# Patient Record
Sex: MALE | Race: WHITE
[De-identification: names, ages, dates, MRNs, and addresses within clinical notes are randomized per-mention and may not be internally consistent; named-entity substitution may affect disease eponyms.]

---

## 2019-04-05 ENCOUNTER — HOSPITAL ENCOUNTER (EMERGENCY)
Dept: HOSPITAL 94 - ER | Age: 44
Discharge: HOME | End: 2019-04-05
Payer: MEDICARE

## 2019-04-05 VITALS — DIASTOLIC BLOOD PRESSURE: 90 MMHG | SYSTOLIC BLOOD PRESSURE: 132 MMHG

## 2019-04-05 VITALS — HEIGHT: 72 IN | BODY MASS INDEX: 25.79 KG/M2 | WEIGHT: 190.39 LBS

## 2019-04-05 DIAGNOSIS — Z76.0: ICD-10-CM

## 2019-04-05 DIAGNOSIS — Z56.0: ICD-10-CM

## 2019-04-05 DIAGNOSIS — F41.9: Primary | ICD-10-CM

## 2019-04-05 PROCEDURE — 99283 EMERGENCY DEPT VISIT LOW MDM: CPT

## 2019-04-05 SDOH — ECONOMIC STABILITY - INCOME SECURITY: UNEMPLOYMENT, UNSPECIFIED: Z56.0

## 2019-05-23 ENCOUNTER — HOSPITAL ENCOUNTER (INPATIENT)
Dept: HOSPITAL 94 - ADULT MH | Age: 44
LOS: 13 days | Discharge: HOME | DRG: 885 | End: 2019-06-05
Attending: PSYCHIATRY & NEUROLOGY | Admitting: PSYCHIATRY & NEUROLOGY
Payer: MEDICARE

## 2019-05-23 VITALS — DIASTOLIC BLOOD PRESSURE: 99 MMHG | SYSTOLIC BLOOD PRESSURE: 134 MMHG

## 2019-05-23 VITALS — DIASTOLIC BLOOD PRESSURE: 91 MMHG | SYSTOLIC BLOOD PRESSURE: 130 MMHG

## 2019-05-23 VITALS — WEIGHT: 184.75 LBS | BODY MASS INDEX: 28 KG/M2 | HEIGHT: 68 IN

## 2019-05-23 DIAGNOSIS — N48.30: ICD-10-CM

## 2019-05-23 DIAGNOSIS — F25.1: Primary | ICD-10-CM

## 2019-05-23 DIAGNOSIS — F32.9: ICD-10-CM

## 2019-05-23 DIAGNOSIS — I10: ICD-10-CM

## 2019-05-23 DIAGNOSIS — Z71.6: ICD-10-CM

## 2019-05-23 DIAGNOSIS — R45.851: ICD-10-CM

## 2019-05-23 DIAGNOSIS — F17.210: ICD-10-CM

## 2019-05-23 DIAGNOSIS — Z82.0: ICD-10-CM

## 2019-05-23 DIAGNOSIS — E78.5: ICD-10-CM

## 2019-05-23 PROCEDURE — 84443 ASSAY THYROID STIM HORMONE: CPT

## 2019-05-23 PROCEDURE — 81003 URINALYSIS AUTO W/O SCOPE: CPT

## 2019-05-23 PROCEDURE — 80320 DRUG SCREEN QUANTALCOHOLS: CPT

## 2019-05-23 PROCEDURE — 83036 HEMOGLOBIN GLYCOSYLATED A1C: CPT

## 2019-05-23 PROCEDURE — 85025 COMPLETE CBC W/AUTO DIFF WBC: CPT

## 2019-05-23 PROCEDURE — 80305 DRUG TEST PRSMV DIR OPT OBS: CPT

## 2019-05-23 PROCEDURE — 80053 COMPREHEN METABOLIC PANEL: CPT

## 2019-05-23 PROCEDURE — 80061 LIPID PANEL: CPT

## 2019-05-23 PROCEDURE — 36415 COLL VENOUS BLD VENIPUNCTURE: CPT

## 2019-05-23 PROCEDURE — 99285 EMERGENCY DEPT VISIT HI MDM: CPT

## 2019-05-23 PROCEDURE — 87070 CULTURE OTHR SPECIMN AEROBIC: CPT

## 2019-05-23 NOTE — NUR
Admit note:

    Pt admitted to [facility] on a 5150 for depression at 1420 as danger 
to self. Pt reports he is suffering from auditory hallucinations that tell him to kill 
himself. Pt reports feeling suicidal as a direct result of the voices. Pt brought to room 
330A. Pt oriented to the unit. Pt cooperative with the admission process. Pt oriented to the 
unit. Medication rec completed.
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
## 2019-05-24 VITALS — SYSTOLIC BLOOD PRESSURE: 130 MMHG | DIASTOLIC BLOOD PRESSURE: 89 MMHG

## 2019-05-24 VITALS — DIASTOLIC BLOOD PRESSURE: 74 MMHG | SYSTOLIC BLOOD PRESSURE: 121 MMHG

## 2019-05-24 LAB
CHOLEST SERPL-MCNC: 238 MG/DL (ref 0–200)
CHOLEST/HDLC SERPL: 6.8 {RATIO} (ref 0–4.99)
HBA1C MFR BLD: 5.9 % (ref 4.5–6.2)
HDLC SERPL-MCNC: 35 MG/DL (ref 35–60)
LDLC SERPL DIRECT ASSAY-MCNC: 170 MG/DL (ref 50–100)
TRIGL SERPL-MCNC: 186 MG/DL (ref 20–135)

## 2019-05-24 RX ADMIN — NICOTINE SCH PATCH: 21 PATCH, EXTENDED RELEASE TRANSDERMAL at 12:42
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
## 2019-05-25 VITALS — DIASTOLIC BLOOD PRESSURE: 89 MMHG | SYSTOLIC BLOOD PRESSURE: 133 MMHG

## 2019-05-25 VITALS — DIASTOLIC BLOOD PRESSURE: 87 MMHG | SYSTOLIC BLOOD PRESSURE: 124 MMHG

## 2019-05-25 RX ADMIN — OLANZAPINE SCH MG: 5 TABLET, FILM COATED ORAL at 08:11

## 2019-05-25 RX ADMIN — NICOTINE SCH PATCH: 21 PATCH, EXTENDED RELEASE TRANSDERMAL at 08:12

## 2019-05-25 NOTE — NUR
Nursing Progress Note:



Legal hold: 5150



Client on involuntary status for DTS



Report received from MIGNON Magana with use of SBAR.



Why are they here: Pt admitted to [facility] on a 5150 for depression for DTS. Pt reports he is 
suffering from auditory hallucinations that tell him to kill himself. Pt reports feeling 
suicidal as a direct result of the voices. 



Assessment



What has happened this shift: Pt asleep at start of shift. Pt isolates some of the shift 
however, with prompting and encouragement he did go to the am group and outdoors with the 
group in the late afternoon. He agrees the medications are helping with his voices but does 
not elaborate on how the medication helps. Both Ativan and hydroxyzine given as PRNs to 
decrease his anxiety. 

S/I, H/I: Denies

A/VH: Denies

Sleep: SEE sleep assessment 

ADL's: Independent

Group attendance: AM and outside group 

Were meds taken: Yes

Any med S/E: None noted or reported



Mental Status Exam



Appearance: Clean, well groomed

Eye contact: Poor

Behavior: Isolative

Speech: low volume, rate, rhythm

Mood: Depressed

Affect: restricted, congruent to mood

Thought process: Linear 

Thought Content: unknown; appears to be RIS

Cognition: A /Ox4 

Insight: Fair

Judgment: Poor 



Interventions



PRN's used: 



Therapeutic interventions: Therapeutic communication and active listening, medication 
administration/monitoring/education, encouragement to attend groups, symptom identification 
and management, Q 15 min checks.



Restraints/seclusion/emergency medication: None



Justification of Continued Inpatient Treatment: Therapeutic support and medication 
management needed to provide stabilization of current A/H telling him to hurt and kill 
himself also prevent decompensation decreasing risk to patient for readmittance to 
in-patient unit.

## 2019-05-25 NOTE — NUR
Nursing Progress Note:



Legal hold: 5150



Client on involuntary status for DTS



Report received from MIGNON Barcenas with use of SBAR.



Why are they here: Pt admitted to [facility] on a 5150 for depression for DTS. Pt reports he is 
suffering from auditory hallucinations that tell him to kill himself. Pt reports feeling 
suicidal as a direct result of the voices. 



Assessment



What has happened this shift: Patient is in his room at change of shift laying in bed in the 
dark. He agrees to a 1:1 assessment at his bedside. His left forearm dressing is CDI at this 
time and no drainage or odor is noted. He denies SI/HI, and VH. He states he is still 
hearing voices but they are not telling him ot harm himself and that they have decreased a 
"tiny bit" since being on the unit. He expresses that he does feel more depressed today and 
rate it a 8/10, but states that going outside today helped a bit. He makes good eye contact 
during conversation and talks a bit more than yesterday. He joins other clients in the group 
room for evening snack before going back to bed. He is compliant for all evening 
medications.

 

S/I, H/I: Denies

A/VH: Confirms AH, denies VH

Sleep: SEE sleep assessment 

ADL's: Independent

Group attendance: No groups this shift

Were meds taken: Yes

Any med S/E: None noted or reported



Mental Status Exam



Appearance: Clean, well groomed

Eye contact: Fair

Behavior: Isolative

Speech: low volume, rate, rhythm

Mood: Depressed

Affect: Restricted, congruent to mood

Thought process: Linear 

Thought Content: Unknown; appears to be RIS

Cognition: A /Ox4 

Insight: Fair

Judgment: Poor 



Interventions



PRN's used: Tylenol



Therapeutic interventions: Therapeutic communication and active listening, medication 
administration/monitoring/education, encouragement to attend groups, symptom identification 
and management, Q 15 min checks.



Restraints/seclusion/emergency medication: None



Justification of Continued Inpatient Treatment: Therapeutic support and medication 
management needed to provide stabilization of current A/H telling him to hurt and kill 
himself also prevent decompensation decreasing risk to patient for readmittance to 
in-patient unit.
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
## 2019-05-26 VITALS — SYSTOLIC BLOOD PRESSURE: 126 MMHG | DIASTOLIC BLOOD PRESSURE: 98 MMHG

## 2019-05-26 VITALS — DIASTOLIC BLOOD PRESSURE: 84 MMHG | SYSTOLIC BLOOD PRESSURE: 112 MMHG

## 2019-05-26 RX ADMIN — NICOTINE SCH PATCH: 21 PATCH, EXTENDED RELEASE TRANSDERMAL at 08:12

## 2019-05-26 RX ADMIN — OLANZAPINE SCH MG: 5 TABLET, FILM COATED ORAL at 07:47

## 2019-05-26 RX ADMIN — NICOTINE SCH PATCH: 21 PATCH, EXTENDED RELEASE TRANSDERMAL at 07:49

## 2019-05-26 RX ADMIN — OLANZAPINE SCH MG: 5 TABLET, ORALLY DISINTEGRATING ORAL at 21:02

## 2019-05-26 NOTE — NUR
Nursing Progress Note:



Legal hold: 5150



Client on involuntary status for DTS



Report received from MIGNON Magana with use of SBAR.



Why are they here: Pt admitted to [facility] on a 5150 for depression at 1420 for DTS. Pt reports 
he is suffering from auditory hallucinations that tell him to kill himself.  Pt reports 
feeling suicidal as a direct result of the voices. 



Assessment



What has happened this shift: Isolated in his room for the majority of the shift. Out for 
meals. Eats poorly and quickly. Returns immediately to his room. When asked to explain the 
importance of being in his room, patient stated "Being around people makes me feel anxious." 
When asked the meaning behind staying in his bed, patient stated "I just lay in bed and 
listen to my voices." Understates the suffering he is experiences due to AH. Believes 
"they'll always be be there. Here I feel safe." in reference to self-harm behavior.  Has no 
hope at present that medication will be able to diminish or eliminate the voices. States he 
was once happy "when I was . My wife ended the marriage. She didn't want to be with 
someone on disability." Patient does not speak unless spoken to. Also he provides the 
minimum amount of information to answer questions posed by staff. 

 

S/I, H/I: Denies; states when he is in a hospital they don't tell me to hurt myself. 

A/VH: Denies

Sleep: SEE sleep assessment provided in EHR 

ADL's: Independent

Group attendance: Not today

Were meds taken: Yes

Any med S/E: None noted or reported



Mental Status Exam



Appearance: Clean, well groomed

Eye contact: Poor

Behavior: Isolative

Speech: low volume, rate, rhythm

Mood: Depressed

Affect: restricted, congruent to mood

Thought process: Linear 

Thought Content: unknown; appears to be RIS

Cognition: A & Ox4 

Insight: Fair

Judgment: Poor 



Interventions



PRN's used: 



Therapeutic interventions: 1:1 assessment , unit procedure education, medication 
administration/monitoring/education, encouragement to attend groups, therapeutic 
conversation & establishment of rapport, symptom identification and management, Q 15 min 
checks.



Restraints/seclusion/emergency medication: None



Justification of Continued Inpatient Treatment: Therapeutic support and medication 
management needed to provide stabilization of current A/H telling him to hurt and kill 
himself also prevent decompensation decreasing risk to patient for readmittance to 
in-patient unit.

## 2019-05-26 NOTE — NUR
Nursing Progress Note:



Legal hold: VOL for DTS



Report received from MIGNON Barcenas with use of SBAR.



Why are they here: Pt admitted to [facility] on a 5150 for depression for DTS. Pt reports he is 
suffering from auditory hallucinations that tell him to kill himself. Pt reports feeling 
suicidal as a direct result of the voices. 



Assessment



What has happened this shift: 



Pt in room looking out the window at change of shift. He attended HS snack then returned to 
his room to sleep. During 1:1, he stated "I feel 9/10 on the good scale, I am feeling 
better." Voices are still present, but "less and more in the background" and saying "get a 
job or go help others". Pt states even though the voices are making positive statements, 
they are irksome. Pt denies SI and says he feels a "a little anxious". He states he doesn't 
like going to groups because it makes him nervous that they will make him talk. RN 
encouraged to go, and be honest about feeling that way with the . Left forearm 
dressing CDI. He requested an additional snack before turning in to sleep for the evening.

 

S/I, H/I: Denies

A/VH: Denies VH, +AH: positive statements (see above)

Sleep: See Sleep Hour Charting

ADL's: Independent

Group attendance:Y - HS Snack

Were meds taken: Y

Any med S/E: None noted or reported



Mental Status Exam



Appearance: Clean, well groomed in personal clothing and nonskid socks

Eye contact: Intermittent

Behavior: Withdrawn to room except to attend snack

Speech: WNL

Mood: "I feel better"

Affect: Blunted

Thought process: Linear 

Thought Content: the voices bothering him

Cognition: A&Ox4 

Insight: Fair

Judgment: Poor to Fair



Interventions



PRN's used: Tylenol, Atarax



Therapeutic interventions: Therapeutic communication and active listening, medication 
administration/monitoring/education, encouragement to attend groups, symptom identification 
and management, Q 15 min checks.



Restraints/seclusion/emergency medication: None



Justification of Continued Inpatient Treatment: Therapeutic support and medication 
management needed to provide stabilization of current A/H to  prevent decompensation 
decreasing risk to patient for readmittance to in-patient unit.

## 2019-05-27 VITALS — DIASTOLIC BLOOD PRESSURE: 79 MMHG | SYSTOLIC BLOOD PRESSURE: 106 MMHG

## 2019-05-27 VITALS — DIASTOLIC BLOOD PRESSURE: 99 MMHG | SYSTOLIC BLOOD PRESSURE: 140 MMHG

## 2019-05-27 RX ADMIN — NICOTINE POLACRILEX PRN LOZ: 2 LOZENGE ORAL at 19:04

## 2019-05-27 RX ADMIN — OLANZAPINE SCH MG: 5 TABLET, FILM COATED ORAL at 08:04

## 2019-05-27 RX ADMIN — NICOTINE POLACRILEX PRN LOZ: 2 LOZENGE ORAL at 15:01

## 2019-05-27 RX ADMIN — NICOTINE POLACRILEX PRN LOZ: 2 LOZENGE ORAL at 10:03

## 2019-05-27 RX ADMIN — OLANZAPINE SCH MG: 5 TABLET, ORALLY DISINTEGRATING ORAL at 20:54

## 2019-05-27 NOTE — NUR
Nursing Progress Note:



Legal hold: VOL for DTS



Report received from MIGNON Barcenas with use of SBAR.



Why are they here: Pt admitted to [facility] on a 5150 for depression for DTS. Pt reports he is 
suffering from auditory hallucinations that tell him to kill himself. Pt reports feeling 
suicidal as a direct result of the voices. 



Assessment



What has happened this shift: 



Pt in room looking out the window at change of shift. He attended HS snack then watched some 
TV with peers before returing to his room. During 1:1, he stated "I feel much better since 
being admitted. I feel good today." He continued, "The voices are saying neutral things, I 
can't remember what they say exactly, its more like in the background really faint." RN 
reinforced pt going to groups, pt states "I went even though I don't like to share. I'll 
keep going and maybe I'll share more next time."   Left forearm dressing CDI. 

 

S/I, H/I: Denies

A/VH: Denies VH, +AH: "neutral statements" "background noise"

Sleep: See Sleep Hour Charting

ADL's: Independent

Group attendance:Y - HS Snack

Were meds taken: Y

Any med S/E: None noted or reported



Mental Status Exam



Appearance: Clean, well groomed in personal clothing and nonskid socks

Eye contact: Intermittent

Behavior: Withdrawn to room except to attend snack

Speech: WNL

Mood: "Good"

Affect: Blunted with some brightening

Thought process: Linear 

Thought Content: Focusing on overall wellness

Cognition: A&Ox4 

Insight: Fair to good

Judgment: Fair to good



Interventions



PRN's used: Tylenol



Therapeutic interventions: Therapeutic communication and active listening, medication 
administration/monitoring/education, encouragement to attend groups, symptom identification 
and management, Q 15 min checks.



Restraints/seclusion/emergency medication: None



Justification of Continued Inpatient Treatment: Therapeutic support and medication 
management needed to provide stabilization of current A/H to  prevent decompensation 
decreasing risk to patient for readmittance to in-patient unit.

## 2019-05-27 NOTE — NUR
Nursing Progress Note:



Legal hold: 5250 DTS



Report received from Mable Sandoval RN with use of SBAR.



Why are they here: Pt admitted to [facility] on a 5150 for depression for DTS. Pt reports he is 
suffering from auditory hallucinations that tell him to kill himself. Pt reports feeling 
suicidal as a direct result of the voices. 



Assessment



What has happened this shift: 



Patient observed sleeping at change of shift. He is awoken just prior to breakfast and joins 
others in the group room for breakfast. He quickly eats and then returns to his room. He 
takes his morning medications but refuses to take Lipitor. He states that it makes it hard 
for him to stand and makes his knees hurt. He says that he hears voices all the time but 
that when he tries to figure out what they say he cannot. He states they increase when he is 
alone but that he does not like being around others. He reports attempting group but not 
liking to have to share. He is encouraged to attend group and pass on sharing. He states 
that he will try.



Patient does attend both groups today. He reports that he mostly listened but did share as 
well. He states that he enjoys the window in his room and finds the view  more therapeutic 
than anything else. He is friendly throughout the day. He c/o nicotine cravings twice and is 
provided lozenges. He states that he is doing well and has no issues.



*Wound cleaned and dressing changed.*





S/I, H/I: none reported

A/VH: reports constant AH, unable to determine what they are saying at this time

Sleep: 7.25hrs NOC

ADL's: Independent

Group attendance:yes

Were meds taken: refused Lipitor

Any med S/E: none reported, no IMs or tremors observed



Mental Status Exam



Appearance: clean, dressed in own clothing

Eye contact: avoided, looks down often during conversation

Behavior: calm, cooperative, moments of anxiety

Speech: soft tone, normal rate and rhythm 

Mood: reports good mood

Affect: restricted with brightening

Thought process: Linear 

Thought Content: no delusional thought content expressed, focused on feeling better

Cognition: A&Ox4 

Insight: Fair

Judgment: Poor to Fair



Interventions



PRN's used: nicotine lozenges xs2 and Atarax for anxiety



Therapeutic interventions: Provided therapeutic assessment, maintained safe therapeutic 
milieu, provided active listening with positive feedback, medication education as needed, 
monitored for change in behavior and needed intervention. Q 15 min checks.



Restraints/seclusion/emergency medication: None



Justification of Continued Inpatient Treatment: Therapeutic support and medication 
management needed to provide stabilization of current A/H to  prevent decompensation 
decreasing risk to patient for readmittance to in-patient unit.

-------------------------------------------------------------------------------

Addendum: 05/27/19 at 1652 by Trina Pemberton RN

-------------------------------------------------------------------------------

Legal hold: voluntary

## 2019-05-28 VITALS — SYSTOLIC BLOOD PRESSURE: 144 MMHG | DIASTOLIC BLOOD PRESSURE: 94 MMHG

## 2019-05-28 VITALS — SYSTOLIC BLOOD PRESSURE: 121 MMHG | DIASTOLIC BLOOD PRESSURE: 86 MMHG

## 2019-05-28 RX ADMIN — NICOTINE POLACRILEX PRN LOZ: 2 LOZENGE ORAL at 20:24

## 2019-05-28 RX ADMIN — NICOTINE POLACRILEX PRN LOZ: 2 LOZENGE ORAL at 08:03

## 2019-05-28 RX ADMIN — OLANZAPINE SCH MG: 5 TABLET, FILM COATED ORAL at 08:04

## 2019-05-28 RX ADMIN — NICOTINE POLACRILEX PRN LOZ: 2 LOZENGE ORAL at 12:17

## 2019-05-28 RX ADMIN — NICOTINE POLACRILEX PRN LOZ: 2 LOZENGE ORAL at 16:37

## 2019-05-28 RX ADMIN — OLANZAPINE SCH MG: 5 TABLET, ORALLY DISINTEGRATING ORAL at 20:06

## 2019-05-28 NOTE — NUR
Initial: patient eating 100% of meals today. Average PO intake % with 

some meals earlier in admission average 25-49%. Overall meeting his 

nutrition needs. 

Recommend: 

1. continue vegetarian diet

2. weekly weights

-------------------------------------------------------------------------------

Addendum: 05/28/19 at 1256 by Gabriela Lackey RD

-------------------------------------------------------------------------------

Amended: Links added.

## 2019-05-28 NOTE — NUR
Nursing Progress Note:



Legal hold: voluntary



Report received from Mable Sandoval RN with use of SBAR.



Why are they here: Pt admitted to [facility] on a 5150 for depression for DTS. Pt reports he is 
suffering from auditory hallucinations that tell him to kill himself. Pt reports feeling 
suicidal as a direct result of the voices. 



Assessment



What has happened this shift: 



Patient observed sleeping at change of shift. Just before breakfast he wakes. He states that 
he slept OK the night before, waking occasionally throughout the night. Patient joins others 
in the group room for breakfast and then returns to his room to take his medications. He 
denies distinguishable AH and does not want to harm himself. He states that the medication 
has been helpful in treating his symptoms. He is observed throughout the day spending 
appropriate amount of time alone as well as with others. He paces the chase, reads books, 
rests and watches t.v..





S/I, H/I: none reported

A/VH: reports constant AH, unable to determine what they are saying at this time

Sleep: 7.25hrs NOC and rested during the day

ADL's: Independent

Group attendance:yes, afternoon group

Were meds taken: refused Lipitor

Any med S/E: none reported, no IMs or tremors observed



Mental Status Exam



Appearance: clean, dressed in own clothing

Eye contact: improved, will look away with prolong eye contact

Behavior: calm, cooperative, moments of anxiety

Speech: soft tone, normal rate and rhythm 

Mood: reports good mood

Affect: restricted with brightening

Thought process: Linear 

Thought Content: no delusional thought content expressed, focused on feeling better

Cognition: A&Ox4 

Insight: Fair

Judgment: Poor to Fair



Interventions



PRN's used: nicotine lozenges xs2 and Tylenol for pain

Therapeutic interventions: Provided therapeutic assessment, maintained safe therapeutic 
milieu, provided active listening with positive feedback, medication education as needed, 
monitored for change in behavior and needed intervention. Q 15 min checks.



Restraints/seclusion/emergency medication: None



Justification of Continued Inpatient Treatment: Patient recently suffering from negative and 
command audio hallucinations telling him to harm himself. Patient presented to unit suicidal 
with self inflicted burns on his arm. Therapeutic support and medication management needed 
to provide stabilization, prevent decompensation, decreasing risk to patient for 
readmittance to in-patient unit.

## 2019-05-28 NOTE — NUR
Nursing Progress Note:



Legal hold: voluntary



Report received from MIGNON Barcenas with use of SBAR.



Why are they here: Pt admitted to [facility] on a 5150 for depression for DTS. Pt reports he is 
suffering from auditory hallucinations that tell him to kill himself. Pt reports feeling 
suicidal as a direct result of the voices. 



Assessment



What has happened this shift: The patient has been up on the unit but withdrawn and quiet. 
During the evening assessment he gave minimal eye contact. He stated that his appetite has 
been good. Stated last night he slept poorly last night. He stated this evening his anxiety 
was relatively low. He stated that he is hearing voices but stated half the time he can't 
understand what they are saying but they have been telling him "get a job" "way to go" 
"cheer up" He denies feeling paranoid. He denies tactile or visual hallucinations. He stated 
that he felt his mood was "stable" He denies significant depression. His brother came to 
visit and he states he is supportive. 







S/I, H/I: none reported

A/VH: He denies visual, tactile hallucinations and paranoia. Continues to have auditory 
hallucinations

Sleep: 

ADL's: Independent

Group attendance: No PM group

Were meds taken: Yes

Any med S/E: none reported or observed



Mental Status Exam



Appearance: clean, dressed in own clothing

Eye contact: Poor eye contact

Behavior: calm, cooperative, moments of anxiety

Speech: soft tone, normal rate and rhythm 

Mood: reports good mood

Affect: restricted 

Thought process: Linear 

Thought Content: no delusional thought content expressed

Cognition: A&Ox4 

Insight: Fair

Judgment: Poor to Fair



Interventions



PRN's used: nicotine lozenges xs2 and Tylenol for pain

Therapeutic interventions: Provided therapeutic assessment, maintained safe therapeutic 
milieu, provided active listening with positive feedback, medication education as needed, 
monitored for change in behavior and needed intervention. Q 15 min checks.



Restraints/seclusion/emergency medication: None



Justification of Continued Inpatient Treatment: Patient recently suffering from negative and 
command audio hallucinations telling him to harm himself. Patient presented to unit suicidal 
with self inflicted burns on his arm. Therapeutic support and medication management needed 
to provide stabilization, prevent decompensation, decreasing risk to patient for 
readmittance to in-patient unit.

## 2019-05-29 VITALS — SYSTOLIC BLOOD PRESSURE: 134 MMHG | DIASTOLIC BLOOD PRESSURE: 92 MMHG

## 2019-05-29 VITALS — DIASTOLIC BLOOD PRESSURE: 82 MMHG | SYSTOLIC BLOOD PRESSURE: 115 MMHG

## 2019-05-29 RX ADMIN — NICOTINE POLACRILEX PRN LOZ: 2 LOZENGE ORAL at 09:05

## 2019-05-29 RX ADMIN — NICOTINE POLACRILEX PRN LOZ: 2 LOZENGE ORAL at 17:53

## 2019-05-29 RX ADMIN — OLANZAPINE SCH MG: 5 TABLET, FILM COATED ORAL at 08:09

## 2019-05-29 RX ADMIN — OLANZAPINE SCH MG: 5 TABLET, ORALLY DISINTEGRATING ORAL at 21:02

## 2019-05-29 RX ADMIN — NICOTINE POLACRILEX PRN LOZ: 2 LOZENGE ORAL at 13:36

## 2019-05-29 NOTE — NUR
Nursing Progress Note:



Legal hold: voluntary



Report received from MIGNON Lopez with use of SBAR.



Why are they here: Pt admitted to [facility] on a 5150 for depression for DTS. Pt reported 
suffering from auditory hallucinations that told him to kill himself. Pt reported feeling 
suicidal as a direct result of the voices. 



Assessment



What has happened this shift: 



Patient is observed walking around at change of shift, he reports that he slept well the 
night before and straight through the night. He says that he is having AH and that they tell 
him to take a shower. He eats his breakfast with others in the group room and then returns 
to his room to rest. He takes all his medications without any issue. 



He does not attend group and states that it is hard for him to sit still. He reports that in 
the middle of the day he feels anxious but when offered Atarax, or Ativan he declines.

He is calm and pleasant throughout the day.



S/I, H/I: none reported

A/VH: reports AH that tell him to take a shower

Sleep: 7hrs NOC and rested during the day

ADL's: Independent

Group attendance: states he tried

Were meds taken: yes

Any med S/E: none reported, no IMs or tremors observed



Mental Status Exam



Appearance: clean, dressed in own clothing but changed into green scrubs

Eye contact: direct

Behavior: friendly and cooperative, moments of anxiety

Speech: soft tone, normal rate and rhythm 

Mood: reports good mood

Affect: appropriate, congruent to mood

Thought process: Linear 

Thought Content: no delusional thought content expressed, focused on feeling better

Cognition: A&Ox4 

Insight: Fair

Judgment: Poor to Fair



Interventions



PRN's used: nicotine lozenges xs2



Therapeutic interventions: Provided therapeutic assessment, maintained safe therapeutic 
milieu, provided active listening with positive feedback, medication education as needed, 
monitored for change in behavior and needed intervention. Q 15 min checks.



Restraints/seclusion/emergency medication: None



Justification of Continued Inpatient Treatment: Patient recently suffering from negative and 
command audio hallucinations telling him to harm himself. Patient presented to unit suicidal 
with self inflicted burns on his arm. Therapeutic support and medication management needed 
to provide stabilization, prevent decompensation, decreasing risk to patient for 
readmittance to in-patient unit.

## 2019-05-29 NOTE — NUR
1:1 DISCHARGE PLANNING:



SW contacted [facility] to learn of pt's assigned medical home. SW informed 
that pt will be assigned to [facility]. SID will schedule aftercare tx w/ 
Dr. Magaña for post discharge tx.



CRISTIAN PérezW

## 2019-05-30 VITALS — SYSTOLIC BLOOD PRESSURE: 126 MMHG | DIASTOLIC BLOOD PRESSURE: 91 MMHG

## 2019-05-30 VITALS — DIASTOLIC BLOOD PRESSURE: 95 MMHG | SYSTOLIC BLOOD PRESSURE: 124 MMHG

## 2019-05-30 RX ADMIN — NICOTINE POLACRILEX PRN LOZ: 2 LOZENGE ORAL at 16:59

## 2019-05-30 RX ADMIN — NICOTINE POLACRILEX PRN LOZ: 2 LOZENGE ORAL at 13:33

## 2019-05-30 RX ADMIN — OLANZAPINE SCH MG: 5 TABLET, FILM COATED ORAL at 08:21

## 2019-05-30 RX ADMIN — OLANZAPINE SCH MG: 5 TABLET, ORALLY DISINTEGRATING ORAL at 20:44

## 2019-05-30 RX ADMIN — NICOTINE POLACRILEX PRN LOZ: 2 LOZENGE ORAL at 08:51

## 2019-05-30 RX ADMIN — NICOTINE POLACRILEX PRN LOZ: 2 LOZENGE ORAL at 19:07

## 2019-05-30 NOTE — NUR
Nursing Progress Note:



Legal hold: VOL for DTS



Report received from MIGNON Sahu with use of SBAR.



Why are they here: Pt admitted to [facility] on a 5150 for depression for DTS. Pt reports he is 
suffering from auditory hallucinations that tell him to kill himself. Pt reports feeling 
suicidal as a direct result of the voices. 



Assessment



What has happened this shift: 



Pt walking the halls at change of shift. During 1:1, pt stated he is feeling "much better" 
and that the voices have faded significantly since his admittance. "I think the Zyprexa is 
really helping." RN stated that pt appears anxious; Pt replied "I do get a little antsy 
that's why I walk around or watch TV. It helps quiet the voices. It's when I'm still the 
voices are clearer. The Remeron is helping me relax and sleep at night, and the voices 
aren't saying anything negative, I can just notice them more if I'm not doing anything." He 
attended HS snack then walked around before returning to his room. Pt's wound is open to 
air; pt not picking it and it is healing well. No drainage or odor present.

 

S/I, H/I: Denies

A/VH: Denies VH, +AH: "background noise, can't remember what they say but it's not negative"

Sleep: See Sleep Hour Charting

ADL's: Independent

Group attendance:Y - HS Snack

Were meds taken: Y

Any med S/E: None noted or reported



Mental Status Exam



Appearance: Clean, well groomed in personal clothing and nonskid socks

Eye contact: Intermittent

Behavior: Walking halls

Speech: Clear

Mood: "Much better"

Affect: Blunted with some brightening

Thought process: Linear 

Thought Content: Focusing on overall wellness

Cognition: A&Ox4 

Insight: Fair to good

Judgment: Fair to good



Interventions



PRN's used: Tylenol



Therapeutic interventions: Therapeutic communication and active listening, medication 
administration/monitoring/education, encouragement to attend groups, symptom identification 
and management, Q 15 min checks.



Restraints/seclusion/emergency medication: None



Justification of Continued Inpatient Treatment: Therapeutic support and medication 
management needed to provide stabilization of current A/H to  prevent decompensation 
decreasing risk to patient for readmittance to in-patient unit.

## 2019-05-30 NOTE — NUR
Nursing Progress Note:



Legal hold: VOL for DTS



Report received from ÁNGEL Lopez with use of SBAR.



Why are they here: Pt admitted to [facility] on a 5150 for depression for DTS. Pt reported he was 
suffering from auditory hallucinations that tell him to kill himself. Pt reported feeling 
suicidal as a direct result of the voices. 



Assessment

What has happened this shift:  Pt was awake and laying in bed at the change of shift. He was 
cooperative with assessment. The pt reported anxiety of 8/10, but refused medication for 
anxiety. Pt was using coping skills and walking to help relieve anxiety. He denied 
depression and SI. He said he was still hearing voices talking, but the voices are not 
telling him to do anything. He said, "Voices in my head, thinking them." He was out of him 
room frequently walking in the halls. PRN Nicotine Lozenge administered x2. He requested 
only one Nicotine lozenge instead of two.  Pt has healing wounds on  L arm, MARIAMA with not 
discharge or odor noted.   



S/I, H/I: Denies

A/VH: Denies VH but states he hears voices talking.

Sleep: Napped

ADL's: Independent

Group attendance: No

Were meds taken: Yes

Any med S/E: None noted or reported



Mental Status Exam

Appearance: Neat and clean

Eye contact: Intermittent

Behavior: Occasionally walks in the halls

Speech: Normal rate and rhythm

Mood: Anxious

Affect: Constricted

Thought process: Linear and connected 

Thought Content: Hearing voices

Cognition: A&Ox4 

Insight: Fair 

Judgment: Fair



Interventions



PRN's used: Tylenol, Nicotine lozenge



Therapeutic interventions: Therapeutic communication and active listening, medication 
administration/monitoring/education, encouragement to attend groups, symptom identification 
and management, maintained therapeutic milieu, Q 15 min checks.



Restraints/seclusion/emergency medication: None



Justification of Continued Inpatient Treatment: Therapeutic support and medication 
management needed to provide stabilization of current A/H to  prevent decompensation 
decreasing risk to patient for readmittance to in-patient unit.

-------------------------------------------------------------------------------

Addendum: 05/30/19 at 1714 by Ting Bhakta RN

-------------------------------------------------------------------------------

Amend: Pt's current wt by standing scale is 83.8.  Pt's wt in ER on 5/22 was 80 kg.  There 
is a discrepancy between today's wt and the 63.9 kg wt charted in pt's admission assessment.

## 2019-05-30 NOTE — NUR
1:1 DISCHARGE PLANNING



SW faxed completed new patient packet to [facility] for pt discharge 
appointment scheduling. SID also faxed pt notes, medications and History & Physical 
information. This writer will schedule a discharge appointment with [facility] on Monday, June 3rd, when she returns for her regularly scheduled 
day.



ITZ Pérez

## 2019-05-31 VITALS — SYSTOLIC BLOOD PRESSURE: 143 MMHG | DIASTOLIC BLOOD PRESSURE: 99 MMHG

## 2019-05-31 VITALS — SYSTOLIC BLOOD PRESSURE: 122 MMHG | DIASTOLIC BLOOD PRESSURE: 84 MMHG

## 2019-05-31 RX ADMIN — OLANZAPINE SCH MG: 5 TABLET, FILM COATED ORAL at 08:33

## 2019-05-31 RX ADMIN — NICOTINE POLACRILEX PRN LOZ: 2 LOZENGE ORAL at 19:40

## 2019-05-31 RX ADMIN — OLANZAPINE SCH MG: 5 TABLET, ORALLY DISINTEGRATING ORAL at 20:36

## 2019-05-31 RX ADMIN — NICOTINE POLACRILEX PRN LOZ: 2 LOZENGE ORAL at 15:35

## 2019-05-31 RX ADMIN — NICOTINE POLACRILEX PRN LOZ: 2 LOZENGE ORAL at 11:36

## 2019-05-31 RX ADMIN — NICOTINE POLACRILEX PRN LOZ: 2 LOZENGE ORAL at 08:44

## 2019-05-31 NOTE — NUR
Nursing Progress Note:



Legal hold: VOL for DTS



Report received from MIGNON Sahu with use of SBAR.



Why are they here: Pt admitted to [facility] on a 5150 for depression for DTS. Pt reports he is 
suffering from auditory hallucinations that tell him to kill himself. Pt reports feeling 
suicidal as a direct result of the voices. 



Assessment



What has happened this shift: Pt in walking back to his room from group room at shift 
change. This was first time with patient, established rapport. Pt allowed 1:1 assessment at 
bedside. Pt was cooperative and medication compliant. Pt reports anxiety 3/10 and depression 
3/10. Pt denies SI and states the voices have decreased. Pt states he is hoping he goes home 
soon and will be discharged home to brothers. When asked about wounds on arms, pt does 
acknowledge it was due to self-harm, but would not elaborate. 

 

S/I, H/I: None reported or observed

A/VH: Denies VH, +AH: "voices have decreased"

Sleep: See sleep assessment notation

ADL's: Independent

Group attendance: Night shift, no group

Were meds taken: Medication compliant

Any med S/E: None reported or observed





Mental Status Exam



Appearance: Clean, well groomed in personal clothing and nonskid socks

Eye contact: Intermittent

Behavior: Cooperative, quiet

Speech: Normal rate and rhythm

Mood: Depressed

Affect: Constricted 

Thought process: Linear 

Thought Content: Looking forward to discharge

Cognition: A&Ox4 

Insight: Fair to good

Judgment: Fair to good



Interventions



PRN's used: None



Therapeutic interventions: Therapeutic communication and active listening, medication 
administration/monitoring/education, encouragement to attend groups, symptom identification 
and management, Q 15 min checks.



Restraints/seclusion/emergency medication: None



Justification of Continued Inpatient Treatment: Therapeutic support and medication 
management needed to provide stabilization of current A/H to  prevent decompensation 
decreasing risk to patient for readmittance to in-patient unit.

## 2019-05-31 NOTE — NUR
Nursing Progress Note:



Legal hold: VOL for DTS



Report received from ÁNGEL Patel with use of SBAR.



Why are they here: Pt admitted to [facility] on a 5150 for depression for DTS. Pt reported he was 
suffering from auditory hallucinations that tell him to kill himself. Pt reported feeling 
suicidal as a direct result of the voices. 



Assessment

What has happened this shift:  Pt was asleep at change of shift and up for breakfast.  
Patient was cooperative with assessment.  Patient states he is depressed but denies suicidal 
ideation.  Patient states he is still hearing voices  but the voices are not telling him to 
hurt himself.  Patient has healing wounds on  L arm. 



S/I, H/I: Denies

A/VH: Denies VH but states he hears voices talking.

Sleep: Napped

ADL's: Independent

Group attendance: No

Were meds taken: Yes

Any med S/E: None noted or reported



Mental Status Exam

Appearance: Neat and clean

Eye contact: Intermittent

Behavior: Occasionally walks in the halls

Speech: Normal rate and rhythm

Mood: Anxious

Affect: Constricted

Thought process: Linear and connected 

Thought Content: Hearing voices

Cognition: A&Ox4 

Insight: Fair 

Judgment: Fair



Interventions



PRN's used: Nicotine lozenge



Therapeutic interventions: Therapeutic communication and active listening, medication 
administration/monitoring/education, encouragement to attend groups, symptom identification 
and management, maintained therapeutic milieu, Q 15 min checks.



Restraints/seclusion/emergency medication: None



Justification of Continued Inpatient Treatment: Therapeutic support and medication 
management needed to provide stabilization of current A/H to  prevent decompensation 
decreasing risk to patient for readmittance to in-patient unit.

-------------------------------------------------------------------------------

## 2019-06-01 VITALS — SYSTOLIC BLOOD PRESSURE: 126 MMHG | DIASTOLIC BLOOD PRESSURE: 89 MMHG

## 2019-06-01 VITALS — SYSTOLIC BLOOD PRESSURE: 115 MMHG | DIASTOLIC BLOOD PRESSURE: 89 MMHG

## 2019-06-01 RX ADMIN — NICOTINE POLACRILEX PRN LOZ: 2 LOZENGE ORAL at 08:07

## 2019-06-01 RX ADMIN — OLANZAPINE SCH MG: 5 TABLET, FILM COATED ORAL at 07:56

## 2019-06-01 RX ADMIN — NICOTINE POLACRILEX PRN LOZ: 2 LOZENGE ORAL at 16:55

## 2019-06-01 RX ADMIN — OLANZAPINE SCH MG: 5 TABLET, ORALLY DISINTEGRATING ORAL at 21:02

## 2019-06-01 RX ADMIN — NICOTINE POLACRILEX PRN LOZ: 2 LOZENGE ORAL at 13:47

## 2019-06-01 NOTE — NUR
Nursing Progress Note:



Legal hold: VOL for DTS



Report received from MIGNON Sahu with use of SBAR.



Why are they here: Pt admitted to [facility] on a 5150 for depression for DTS. Pt reports he is 
suffering from auditory hallucinations that tell him to kill himself. Pt reports feeling 
suicidal as a direct result of the voices. 



Assessment



What has happened this shift: Pt sitting in rec room at shift change. Pt is cooperative with 
1:1 assessment. RN assessed healing left arm burns. It appears pt was scratching at his arms 
AEB of redness.  As per wound care order, lotion was applied and this helped with the itch. 
Pt reports his depression is 3/10 and anxiety 2/10. Pt states his voices are telling him 
"not to scratch." Pt took HS med then retired to bed. 



  

S/I, H/I: None reported or observed

A/VH: Denies VH, +AH: voices telling pt not to scratch at healing burn scars

Sleep: Pt currently sleeping

ADL's: Independent

Group attendance: Night shift, no group

Were meds taken: Medication compliant

Any med S/E: None reported or observed





Mental Status Exam



Appearance: Clean, well groomed in personal clothing 

Eye contact: Intermittent

Behavior: Cooperative, quiet

Speech: Normal rate and rhythm

Mood: "Feeling better"

Affect: Constricted 

Thought process: Linear 

Thought Content: Looking forward to discharge

Cognition: A&Ox4 

Insight: Fair

Judgment: Fair 



Interventions



PRN's used: Nicotine lozenge



Therapeutic interventions: Therapeutic communication and active listening, medication 
administration/monitoring/education, encouragement to attend groups, symptom identification 
and management, Q 15 min checks.



Restraints/seclusion/emergency medication: None



Justification of Continued Inpatient Treatment: Therapeutic support and medication 
management needed to provide stabilization of current A/H to  prevent decompensation 
decreasing risk to patient for readmittance to in-patient unit.

## 2019-06-01 NOTE — NUR
Nursing Progress Note:



Legal hold: VOL for DTS



Report received from MIGNON Edouard with use of SBAR.



Why are they here: Pt admitted to [facility] on a 5150 for depression for DTS. Pt reports he is 
suffering from auditory hallucinations that tell him to kill himself. Pt reports feeling 
suicidal as a direct result of the voices. 



Assessment



What has happened this shift: Pt is laying in bed at shift change asleep. Pt is woken up and 
is cooperative with 1:1 assessment. RN assessed left arm burns which are clean, dry, and 
open to air. Pt was offered cream for his burns, but he refused. Pt denies any SI/HI/AH/VH. 



  

S/I, H/I: denies

A/VH: Denies 

Sleep: see sleep assessment notation 

ADL's: Independent

Group attendance: Night shift, no group

Were meds taken: Medication compliant

Any med S/E: None reported or observed





Mental Status Exam



Appearance: well groomed in personal clothing 

Eye contact: poor

Behavior: Cooperative, quiet

Speech: Normal rate and rhythm, sparse 

Mood:"I'm okay, just tired." 

Affect: Constricted 

Thought process: Linear 

Thought Content: wants to sleep

Cognition: A&Ox4 

Insight: Fair

Judgment: Fair 



Interventions



PRN's used: none



Therapeutic interventions: Therapeutic communication and active listening, medication 
administration/monitoring/education, encouragement to attend groups, symptom identification 
and management, Q 15 min checks.



Restraints/seclusion/emergency medication: None



Justification of Continued Inpatient Treatment: Therapeutic support and medication 
management needed to provide stabilization of current A/H to  prevent decompensation 
decreasing risk to patient for readmittance to in-patient unit.

## 2019-06-01 NOTE — NUR
Nursing Progress Note:



Legal hold: VOL for DTS



Report received from MIGNON Sahu with use of SBAR.



Why are they here: Pt admitted to [facility] on a 5150 for depression for DTS. Pt reports he is 
suffering from auditory hallucinations that tell him to kill himself. Pt reports feeling 
suicidal as a direct result of the voices. 



Assessment



What has happened this shift: Pt sitting in rec room at shift change. Pt is coopertive with 
1:1 assessment. RN assessed healing left arm burns. It appears pt was scratching at his arms 
AEB of redness.  As per wound care order, lotion was applied and this helped with the itch. 
Pt reports his depression is 3/10 and anxiety 2/10. Pt states his voices are telling him 
"not to scratch." Pt took HS med then retired to bed. 

  

S/I, H/I: None reported or observed

A/VH: Denies VH, +AH: voices telling pt not to scratch at healing burn scars

Sleep: Pt currently sleeping

ADL's: Independent

Group attendance: Night shift, no group

Were meds taken: Medication compliant

Any med S/E: None reported or observed





Mental Status Exam



Appearance: Clean, well groomed in personal clothing 

Eye contact: Intermittent

Behavior: Cooperative, quiet

Speech: Normal rate and rhythm

Mood: "Feeling better"

Affect: Constricted 

Thought process: Linear 

Thought Content: Looking forward to discharge

Cognition: A&Ox4 

Insight: Fair 

Judgment: Fair 



Interventions



PRN's used: Nicotine lozenge



Therapeutic interventions: Therapeutic communication and active listening, medication 
administration/monitoring/education, encouragement to attend groups, symptom identification 
and management, Q 15 min checks.



Restraints/seclusion/emergency medication: None



Justification of Continued Inpatient Treatment: Therapeutic support and medication 
management needed to provide stabilization of current A/H to  prevent decompensation 
decreasing risk to patient for readmittance to in-patient unit.

## 2019-06-01 NOTE — NUR
Nursing Progress Note:



Legal hold: Voluntary



Report received from ÁNGEL Patel with use of SBAR.



Why are they here: Pt admitted to [facility] on a 5150 for depression for DTS. Pt reported he was 
suffering from auditory hallucinations that tell him to kill himself. Pt reported feeling 
suicidal as a direct result of the voices. 



Assessment

What has happened this shift:  Received Pt in bed asleep w/o distress after change of shift. 
He awoke for breakfast and ate with others appropriately. Pt is calm and cooperative and 
willing to engage in assessment and conversation with RN. Reports he is getting his head 
straight while here and it has been helpful. States he does not do much at home and we 
discussed positive activities and the idea of working a part time job, to which he was 
amenable. Took naps in AM and PM and attended groups. Reports AH starting at 41 y/o and they 
say both good and bad things about him. Patient continues to endorse being depressed but 
denies suicidal ideation.



S/I, H/I: Denies

A/VH: Denies VH but states he hears voices talking.

Sleep: Napped

ADL's: Independent

Group attendance: Yes

Were meds taken: Yes

Any med S/E: None noted or reported



Mental Status Exam

Appearance: Neat and clean

Eye contact: Intermittent

Behavior: Occasionally walks in the halls

Speech: Normal rate and rhythm

Mood: Anxious

Affect: Constricted

Thought process: Linear and connected 

Thought Content: Hearing voices

Cognition: A&Ox4 

Insight: Fair 

Judgment: Fair



Interventions



PRN's used: Nicotine lozenges



Therapeutic interventions: Therapeutic communication and active listening, medication 
administration/monitoring/education, encouragement to attend groups, symptom identification 
and management, maintained therapeutic milieu, Q 15 min checks.



Restraints/seclusion/emergency medication: None



Justification of Continued Inpatient Treatment: Therapeutic support and medication 
management needed to provide stabilization of current A/H to  prevent decompensation 
decreasing risk to patient for readmittance to in-patient unit.

## 2019-06-02 VITALS — SYSTOLIC BLOOD PRESSURE: 131 MMHG | DIASTOLIC BLOOD PRESSURE: 96 MMHG

## 2019-06-02 VITALS — DIASTOLIC BLOOD PRESSURE: 94 MMHG | SYSTOLIC BLOOD PRESSURE: 132 MMHG

## 2019-06-02 RX ADMIN — OLANZAPINE SCH MG: 5 TABLET, FILM COATED ORAL at 07:16

## 2019-06-02 RX ADMIN — OLANZAPINE SCH MG: 5 TABLET, ORALLY DISINTEGRATING ORAL at 20:50

## 2019-06-02 RX ADMIN — OLANZAPINE SCH MG: 5 TABLET, FILM COATED ORAL at 12:40

## 2019-06-02 NOTE — NUR
Nursing Progress Note:



Legal hold: VOL for DTS



Report received from MIGNON Baker with use of SBAR.



Why are they here: Pt admitted to [facility] on a 5150 for depression for DTS. Pt reports he is 
suffering from auditory hallucinations that tell him to kill himself. Pt reports feeling 
suicidal as a direct result of the voices. 



Assessment



What has happened this shift: Pt is visible on the unit at shift change, walking the chase 
occasionally and sitting in the group room for a short time.RN assessed left arm burns which 
are clean, dry, and open to air. Pt denies any SI/HI/AH/VH. Pt states that he feels the 
medication combination he has been taking is "really helping." Pt is quiet and reserved, 
sits alone in the dark on his bed often, but is always polite and cooperative with 
assessments and conversation. 



  

S/I, H/I: denies

A/VH: Denies 

Sleep: see sleep assessment notation 

ADL's: Independent

Group attendance: Night shift, no group

Were meds taken: Medication compliant

Any med S/E: None reported or observed





Mental Status Exam



Appearance: well groomed in personal clothing 

Eye contact: poor

Behavior: Cooperative, quiet

Speech: Normal rate and rhythm, sparse 

Mood: "feeling a little better." 

Affect: Constricted 

Thought process: Linear 

Thought Content: wants to sleep

Cognition: A&Ox4 

Insight: Fair

Judgment: Fair 



Interventions



PRN's used: none



Therapeutic interventions: Therapeutic communication and active listening, medication 
administration/monitoring/education, encouragement to attend groups, symptom identification 
and management, Q 15 min checks.



Restraints/seclusion/emergency medication: None



Justification of Continued Inpatient Treatment: Therapeutic support and medication 
management needed to provide stabilization of current A/H to  prevent decompensation 
decreasing risk to patient for readmittance to in-patient unit.

## 2019-06-02 NOTE — NUR
Nursing Progress Note:



Legal hold: VOL for DTS



Report received from MIGNON Hatch with use of SBAR.



Why are they here: Pt admitted to [facility] on a 5150 for depression for DTS. Pt reports he is 
suffering from auditory hallucinations that tell him to kill himself. Pt has had previous 
suicide attempts multiple times since the age of 40 when the voices began. Pt reports 
feeling suicidal as a direct result of the voices. 



Assessment



What has happened this shift:Nurse met patient in his room to give medications.  Patient was 
laying down with a mask over his eyes to block the light.  He took all medications as 
prescribed. He reports having slept well last night. Nine hours of sleep were recorded.  His 
affect is expressive. Reports depression, but no suicidal ideation. He states he is still 
bothered by audio hallucinations but they have decreased. Wound is open to air and looks 
better.  Patient refused wound care.

  

S/I, H/I: denies

A/VH: Positive for audio hallucinations  

Sleep: 9 hours

ADL's: Independent

Group attendance:  no group today, isolative

Were meds taken: Medication compliant

Any med S/E: None reported or observed





Mental Status Exam



Appearance: well groomed, partly disheveled, and wearing his own clothes

Eye contact: good

Behavior: Cooperative, quiet, isolative

Speech: Normal rate and rhythm, poverty of

Mood: Depressed

Affect: expressive, open

Thought process: Linear 

Thought Content: audio hallucinations, isolative and sleepy today

Cognition: A&Ox4 

Insight: Fair

Judgment: Fair 



Interventions



PRN's used: none



Therapeutic interventions: Therapeutic communication and active listening, medication 
administration/monitoring/education, encouragement to attend groups, symptom identification 
and management, Q 15 min checks.



Restraints/seclusion/emergency medication: None



Justification of Continued Inpatient Treatment: Therapeutic support and medication 
management needed to provide stabilization of current A/H to  prevent decompensation 
decreasing risk to patient for readmittance to in-patient unit.

## 2019-06-03 VITALS — DIASTOLIC BLOOD PRESSURE: 92 MMHG | SYSTOLIC BLOOD PRESSURE: 133 MMHG

## 2019-06-03 VITALS — DIASTOLIC BLOOD PRESSURE: 90 MMHG | SYSTOLIC BLOOD PRESSURE: 127 MMHG

## 2019-06-03 RX ADMIN — NICOTINE POLACRILEX PRN LOZ: 2 LOZENGE ORAL at 15:47

## 2019-06-03 RX ADMIN — NICOTINE POLACRILEX PRN LOZ: 2 LOZENGE ORAL at 09:03

## 2019-06-03 RX ADMIN — OLANZAPINE SCH MG: 5 TABLET, ORALLY DISINTEGRATING ORAL at 20:52

## 2019-06-03 RX ADMIN — NICOTINE POLACRILEX PRN LOZ: 2 LOZENGE ORAL at 19:40

## 2019-06-03 RX ADMIN — NICOTINE POLACRILEX PRN LOZ: 2 LOZENGE ORAL at 12:02

## 2019-06-03 NOTE — NUR
Nursing Progress Note:



Legal hold: VOL for DTS



Report received from MIGNON Hatch with use of SBAR.



Why are they here: Pt admitted to [facility] on a 5150 for depression for DTS. Pt reports he is 
suffering from auditory hallucinations that tell him to kill himself. Pt has had previous 
suicide attempts multiple times since the age of 40 when the voices began. Pt reports 
feeling suicidal as a direct result of the voices. 



Assessment



What has happened this shift:  Patient was asleep at change of shift and up for breakfast. 
Patient states he slept well.  Patient denies suicidal ideation but states he is still 
depressed.  Patient has had some serious attempts in the past and patient states he is 
feeling better.  RN is a little cautious about moving patient out too quickly.  Patient 
states the voices are not as loud as they have been.  Patient isolates and did not go to 
group today.  



S/I, H/I: denies

A/VH: Positive for audio hallucinations  

Sleep: Cat naps and reads in his room.

ADL's: Independent

Group attendance:  no group today, isolative

Were meds taken: Medication compliant

Any med S/E: None reported or observed





Mental Status Exam



Appearance: well groomed and wearing his own clothes

Eye contact: good

Behavior: Cooperative, quiet, isolative

Speech: Normal rate and rhythm, poverty of

Mood: Depressed

Affect: depressed

Thought process: Linear 

Thought Content: audio hallucinations, isolative

Cognition: A&Ox4 

Insight: Fair

Judgment: Fair 



Interventions



PRN's used: none



Therapeutic interventions: Therapeutic communication and active listening, medication 
administration/monitoring/education, encouragement to attend groups, symptom identification 
and management, Q 15 min checks.



Restraints/seclusion/emergency medication: None



Justification of Continued Inpatient Treatment: Therapeutic support and medication 
management needed to provide stabilization of current A/H to  prevent decompensation 
decreasing risk to patient for readmittance to in-patient unit.

## 2019-06-04 VITALS — SYSTOLIC BLOOD PRESSURE: 128 MMHG | DIASTOLIC BLOOD PRESSURE: 96 MMHG

## 2019-06-04 VITALS — SYSTOLIC BLOOD PRESSURE: 147 MMHG | DIASTOLIC BLOOD PRESSURE: 100 MMHG

## 2019-06-04 RX ADMIN — OLANZAPINE SCH MG: 5 TABLET, ORALLY DISINTEGRATING ORAL at 21:16

## 2019-06-04 RX ADMIN — NICOTINE POLACRILEX PRN LOZ: 2 LOZENGE ORAL at 15:43

## 2019-06-04 RX ADMIN — NICOTINE POLACRILEX PRN LOZ: 2 LOZENGE ORAL at 13:06

## 2019-06-04 RX ADMIN — OLANZAPINE SCH MG: 5 TABLET, FILM COATED ORAL at 07:47

## 2019-06-04 NOTE — NUR
Nursing Progress Note:



Legal hold: VOL for DTS



Report received from MIGNON Baker with use of SBAR.



Why are they here: Pt admitted to [facility] on a 5150 for depression for DTS. Pt reports he is 
suffering from auditory hallucinations that tell him to kill himself. Pt reports feeling 
suicidal as a direct result of the voices. 



Assessment



What has happened this shift: Pt is up and on the unit at shift change, walking the chase 
occasionally and sitting in the group room for a snacks and watching tv.RN assessed left arm 
burns which are clean, dry, and open to air. Pt denies any SI/HI/AH/VH. Pt states that he 
feels the medication he has been taking is "really helping." Pt is quiet and reserved, sits 
alone in the dark on his bed often, but is always polite and cooperative with assessments 
and conversation. 



  

S/I, H/I: denies

A/VH: Denies 

Sleep: see sleep assessment notation 

ADL's: Independent

Group attendance: Night shift, no group

Were meds taken: Medication compliant

Any med S/E: None reported or observed





Mental Status Exam



Appearance: well groomed in personal clothing 

Eye contact: poor

Behavior: Cooperative, quiet

Speech: Normal rate and rhythm, sparse 

Mood: "feeling a little better." 

Affect: Constricted 

Thought process: Linear 

Thought Content: wants to sleep

Cognition: A&Ox4 

Insight: Fair

Judgment: Fair 



Interventions



PRN's used: none



Therapeutic interventions: Therapeutic communication and active listening, medication 
administration/monitoring/education, encouragement to attend groups, symptom identification 
and management, Q 15 min checks.



Restraints/seclusion/emergency medication: None



Justification of Continued Inpatient Treatment: Therapeutic support and medication 
management needed to provide stabilization of current A/H to  prevent decompensation 
decreasing risk to patient for readmittance to in-patient unit.

## 2019-06-04 NOTE — NUR
Nursing Progress Note:



Legal hold: VOL for DTS



Report received from MIGNON Lopez with use of SBAR.



Why are they here: Pt admitted to [facility] on a 5150 for depression for DTS. Pt reports he is 
suffering from auditory hallucinations that tell him to kill himself. Pt has had previous 
suicide attempts multiple times since the age of 40 when the voices began. Pt reports 
feeling suicidal as a direct result of the voices. 



Assessment



What has happened this shift:  Received patient asleep in bed at change of shift. Pt up for 
AM meds and breakfast. He states he slept well and denies suicidal ideation but states he is 
still depressed and continues to endorse hearing AH.. Pt is calm and cooperative, with 
modest depression and a flat affect. Reports he is feeling better and more comfortable with 
the idea of leaving and living with his brother. Discussed connection with community 
resources. Attended PM group and used nicotine lozenges throughout day.



S/I, H/I: denies

A/VH: Positive for audio hallucinations  

Sleep: Cat naps and reads in his room.

ADL's: Independent

Group attendance: Yes

Were meds taken: Medication compliant

Any med S/E: None reported or observed



Mental Status Exam



Appearance: well groomed and wearing his own clothes

Eye contact: good

Behavior: Cooperative, quiet, isolative

Speech: Normal

Mood: Depressed

Affect: Blunted

Thought process: Linear 

Thought Content: audio hallucinations, isolative

Cognition: A&Ox4 

Insight: Fair

Judgment: Fair 



Interventions



PRN's used: Nicotine Lozenges.



Therapeutic interventions: Therapeutic communication and active listening, medication 
administration/monitoring/education, encouragement to attend groups, symptom identification 
and management, Q 15 min checks.



Restraints/seclusion/emergency medication: None



Justification of Continued Inpatient Treatment: Therapeutic support and medication 
management needed to provide stabilization of current A/H to prevent decompensation 
decreasing risk to patient for readmittance to in-patient unit.

## 2019-06-04 NOTE — NUR
Reassessment: Pt continues with % PO intake on vegetarian diet 

meeting nutrient needs. Patient's wt up 19.9 kg since admit bed scale vs 

standing, this amount of wt gain unlikely in 1 weeks time. LB 6/3. Will 

continue to follow.

Recommend:

1. continue vegetarian diet

2. weekly weights

-------------------------------------------------------------------------------

Addendum: 06/04/19 at 1123 by Roseann Cruz RD

-------------------------------------------------------------------------------

Amended: Links added.

## 2019-06-05 VITALS — SYSTOLIC BLOOD PRESSURE: 130 MMHG | DIASTOLIC BLOOD PRESSURE: 92 MMHG

## 2019-06-05 RX ADMIN — OLANZAPINE SCH MG: 5 TABLET, FILM COATED ORAL at 08:03

## 2019-06-05 NOTE — NUR
NURSING PROGRESS NOTE



Legal hold: VOL for DTS



Report received from MIGNON Villalobos with use of SBAR.



Why are they here: Pt admitted to [facility] on a 5150 for depression for DTS. Pt reports he is 
suffering from auditory hallucinations that tell him to kill himself. Pt has had previous 
suicide attempts multiple times since the age of 40 when the voices began. Pt reports 
feeling suicidal as a direct result of the voices. 



Assessment



What has happened this shift:  The patient is up on the unit walking about. He is med 
compliant. States he would like to go home today back to live with his brother. Describes 
living situation as "very supportive" and reports getting along good with his brother and 
his brother's family. He helps out with chores, goes for walks and is included in other 
family activities and trips. States he takes care of the cat and he like the companionship 
of the animal. He feels he is on the right medications. States the voices are "minimal" and 
acknowledges the importance of staying on his medications as prescribed. He is mildly 
depressed with a bland affect. Smiles at times. Attends groups.



S/I, H/I: denies

A/VH: Positive but minimal  

Sleep: naps

ADL's: Independent

Group attendance: Yes

Were meds taken: Yes

Any med S/E: None reported or observed



Mental Status Exam



Appearance: well groomed and wearing his own clothes

Eye contact: good

Behavior: Cooperative, quiet, isolative

Speech: Normal

Mood: Depressed

Affect: Blunted

Thought process: Linear 

Thought Content: on discharge

Cognition: A&Ox4 

Insight: Fair

Judgment: Fair 



Interventions



PRN's used: None



Therapeutic interventions: Therapeutic communication and active listening, medication 
administration/monitoring/education, encouragement to attend groups, symptom identification 
and management, Q 15 min checks.



Restraints/seclusion/emergency medication: None



Justification of Continued Inpatient Treatment: Therapeutic support and medication 
management needed to provide stabilization of current A/H to prevent decompensation 
decreasing risk to patient for readmittance to in-patient unit.

## 2019-06-05 NOTE — NUR
Legal hold: VOL for DTS



Report received from MIGNON Anderson with use of SBAR.



Why are they here: Pt admitted to [facility] on a 5150 for depression for DTS. Pt reports he is 
suffering from auditory hallucinations that tell him to kill himself. Pt reports feeling 
suicidal as a direct result of the voices. 



Assessment



What has happened this shift: Pt is up and on the unit at shift change, walking the chase 
occasionally and sitting in the group room for a snacks and social with peers watching 
tv.LVN assessed left arm burns which are clean, dry, and open to air. Pt denies any 
SI/HI/AH/VH. Pt states that he feels the medication he has been taking is "really helping." 
Pt was more active on the unit this shift, and  is always polite and cooperative with 
assessments and conversation. 



  

S/I, H/I: denies

A/VH: Denies 

Sleep: see sleep assessment notation 

ADL's: Independent

Group attendance: Night shift, no group

Were meds taken: Medication compliant

Any med S/E: None reported or observed





Mental Status Exam



Appearance: well groomed in personal clothing 

Eye contact: good

Behavior: Cooperative, quiet

Speech: Normal rate and rhythm, sparse 

Mood: "feeling a little better." 

Affect: Constricted 

Thought process: Linear 

Thought Content: wants to sleep

Cognition: A&Ox4 

Insight: Fair

Judgment: Fair 



Interventions



PRN's used: none



Therapeutic interventions: Therapeutic communication and active listening, medication 
administration/monitoring/education, encouragement to attend groups, symptom identification 
and management, Q 15 min checks.



Restraints/seclusion/emergency medication: None



Justification of Continued Inpatient Treatment: Therapeutic support and medication 
management needed to provide stabilization of current A/H to  prevent decompensation 
decreasing risk to patient for readmittance to in-patient unit.